# Patient Record
Sex: FEMALE | Race: ASIAN | URBAN - METROPOLITAN AREA
[De-identification: names, ages, dates, MRNs, and addresses within clinical notes are randomized per-mention and may not be internally consistent; named-entity substitution may affect disease eponyms.]

---

## 2023-06-04 ENCOUNTER — OFFICE VISIT (OUTPATIENT)
Dept: FAMILY MEDICINE | Facility: CLINIC | Age: 23
End: 2023-06-04

## 2023-06-04 VITALS
DIASTOLIC BLOOD PRESSURE: 68 MMHG | WEIGHT: 125.5 LBS | TEMPERATURE: 102 F | OXYGEN SATURATION: 98 % | SYSTOLIC BLOOD PRESSURE: 105 MMHG | HEART RATE: 139 BPM

## 2023-06-04 DIAGNOSIS — R50.9 FEVER, UNSPECIFIED FEVER CAUSE: ICD-10-CM

## 2023-06-04 DIAGNOSIS — B34.9 NONSPECIFIC SYNDROME SUGGESTIVE OF VIRAL ILLNESS: Primary | ICD-10-CM

## 2023-06-04 LAB — DEPRECATED S PYO AG THROAT QL EIA: NEGATIVE

## 2023-06-04 PROCEDURE — 99203 OFFICE O/P NEW LOW 30 MIN: CPT

## 2023-06-04 PROCEDURE — 87651 STREP A DNA AMP PROBE: CPT

## 2023-06-04 PROCEDURE — 87635 SARS-COV-2 COVID-19 AMP PRB: CPT

## 2023-06-04 ASSESSMENT — ENCOUNTER SYMPTOMS
ABDOMINAL PAIN: 0
COUGH: 0
RHINORRHEA: 0
CHILLS: 0
FEVER: 1
SORE THROAT: 0
HEADACHES: 1
VOMITING: 0
NAUSEA: 0

## 2023-06-04 NOTE — PROGRESS NOTES
Assessment & Plan     Nonspecific syndrome suggestive of viral illness  -Fever most likely due to a viral illness.  COVID-19 still pending.  Rapid strep negative.  Pending strep PCR.    Fever, unspecified fever cause    - Streptococcus A Rapid Scr w Reflx to PCR  - Symptomatic COVID-19 Virus (Coronavirus) by PCR Nose  - Group A Streptococcus PCR Throat Swab     Results for orders placed or performed in visit on 06/04/23   Streptococcus A Rapid Scr w Reflx to PCR     Status: Normal    Specimen: Throat; Swab   Result Value Ref Range    Group A Strep antigen Negative Negative       Patient Instructions   Strep (-)  Take ibuprofen and acetaminophen for fever, aches and pains. Follow  dosing instructions  Symptoms most likely viral etiology  Follow up with your doctor is fever persists for more than 3 days  Increase fluid intake with Gatorade, Pedialyte or beverage of choice          Return if symptoms worsen or fail to improve, for Follow up, with  PCP.    At the end of the encounter, I discussed results, diagnosis, medications. Discussed red flags for immediate return to clinic/ER, as well as indications for follow up if no improvement. Patient understood and agreed to plan. Patient was stable for discharge.    Subjective     Carole is a 23 year old female who presents to clinic today with partner for the following health issues:  Chief Complaint   Patient presents with     Fever     Since last night.      Headache     Since last night.      HPI    Patient reports fever and headache since last night. Temp was 102 F at home and in the clinic. Decreased appetite. No COVID-19 testing at home.  Patient has been taking an [place] version of acetaminophen to help with the fever and headache.  She denies chills, congestion, runny nose, sore throat, nausea and vomiting, cough.    Review of Systems   Constitutional: Positive for fever. Negative for chills.   HENT: Negative for congestion, rhinorrhea and sore throat.     Respiratory: Negative for cough.    Gastrointestinal: Negative for abdominal pain, nausea and vomiting.   Neurological: Positive for headaches.       Problem List:  There are no relevant problems documented for this patient.      No past medical history on file.    Social History     Tobacco Use     Smoking status: Not on file     Smokeless tobacco: Not on file   Substance Use Topics     Alcohol use: Not on file           Objective    /68 (BP Location: Left arm, Patient Position: Sitting, Cuff Size: Adult Regular)   Pulse (!) 139   Temp (!) 102  F (38.9  C) (Tympanic)   Wt 56.9 kg (125 lb 8 oz)   SpO2 98%   Physical Exam  Constitutional:       Appearance: Normal appearance.   HENT:      Head: Normocephalic.      Right Ear: Tympanic membrane normal.      Left Ear: Tympanic membrane normal.      Mouth/Throat:      Mouth: Mucous membranes are moist.      Pharynx: Oropharynx is clear. Uvula midline. No posterior oropharyngeal erythema.   Cardiovascular:      Rate and Rhythm: Regular rhythm. Tachycardia present.   Pulmonary:      Effort: Pulmonary effort is normal.      Breath sounds: Normal breath sounds.   Lymphadenopathy:      Head:      Right side of head: No submental, submandibular or tonsillar adenopathy.      Left side of head: No submental, submandibular or tonsillar adenopathy.      Cervical: No cervical adenopathy.      Right cervical: No superficial cervical adenopathy.     Left cervical: No superficial cervical adenopathy.   Skin:     General: Skin is warm and dry.      Findings: No rash.   Neurological:      Mental Status: She is alert.   Psychiatric:         Mood and Affect: Mood normal.         Behavior: Behavior normal.              Darling Deleon PA-C

## 2023-06-04 NOTE — PATIENT INSTRUCTIONS
Strep (-)  Take ibuprofen and acetaminophen for fever, aches and pains. Follow  dosing instructions  Symptoms most likely viral etiology  Follow up with your doctor is fever persists for more than 3 days  Increase fluid intake with Gatorade, Pedialyte or beverage of choice

## 2023-06-05 LAB
GROUP A STREP BY PCR: NOT DETECTED
SARS-COV-2 RNA RESP QL NAA+PROBE: NEGATIVE